# Patient Record
Sex: FEMALE | Race: WHITE
[De-identification: names, ages, dates, MRNs, and addresses within clinical notes are randomized per-mention and may not be internally consistent; named-entity substitution may affect disease eponyms.]

---

## 2018-02-16 ENCOUNTER — HOSPITAL ENCOUNTER (OUTPATIENT)
Dept: HOSPITAL 92 - EEG | Age: 73
Discharge: HOME | End: 2018-02-16
Payer: MEDICARE

## 2018-02-16 DIAGNOSIS — G40.409: Primary | ICD-10-CM

## 2018-02-16 PROCEDURE — 95816 EEG AWAKE AND DROWSY: CPT

## 2018-02-21 ENCOUNTER — HOSPITAL ENCOUNTER (INPATIENT)
Dept: HOSPITAL 92 - ERS | Age: 73
LOS: 8 days | Discharge: HOSPICE | DRG: 23 | End: 2018-03-01
Attending: FAMILY MEDICINE | Admitting: FAMILY MEDICINE
Payer: MEDICARE

## 2018-02-21 VITALS — BODY MASS INDEX: 29.5 KG/M2

## 2018-02-21 DIAGNOSIS — E11.22: ICD-10-CM

## 2018-02-21 DIAGNOSIS — I10: ICD-10-CM

## 2018-02-21 DIAGNOSIS — N18.2: ICD-10-CM

## 2018-02-21 DIAGNOSIS — E87.1: ICD-10-CM

## 2018-02-21 DIAGNOSIS — R77.1: ICD-10-CM

## 2018-02-21 DIAGNOSIS — G81.91: ICD-10-CM

## 2018-02-21 DIAGNOSIS — N39.0: ICD-10-CM

## 2018-02-21 DIAGNOSIS — Z66: ICD-10-CM

## 2018-02-21 DIAGNOSIS — I61.5: Primary | ICD-10-CM

## 2018-02-21 DIAGNOSIS — J96.00: ICD-10-CM

## 2018-02-21 DIAGNOSIS — G40.909: ICD-10-CM

## 2018-02-21 DIAGNOSIS — I12.9: ICD-10-CM

## 2018-02-21 DIAGNOSIS — E87.6: ICD-10-CM

## 2018-02-21 DIAGNOSIS — R40.2432: ICD-10-CM

## 2018-02-21 DIAGNOSIS — N17.9: ICD-10-CM

## 2018-02-21 DIAGNOSIS — F03.90: ICD-10-CM

## 2018-02-21 DIAGNOSIS — G91.1: ICD-10-CM

## 2018-02-21 LAB
ALBUMIN SERPL BCG-MCNC: 4.2 G/DL (ref 3.4–4.8)
ALP SERPL-CCNC: 75 U/L (ref 40–150)
ALT SERPL W P-5'-P-CCNC: 20 U/L (ref 8–55)
ANALYZER IN CARDIO: (no result)
ANION GAP SERPL CALC-SCNC: 14 MMOL/L (ref 10–20)
APTT PPP: 34.4 SEC (ref 22.9–36.1)
AST SERPL-CCNC: 23 U/L (ref 5–34)
BASE EXCESS STD BLDA CALC-SCNC: -1.6 MEQ/L
BILIRUB SERPL-MCNC: 0.3 MG/DL (ref 0.2–1.2)
BUN SERPL-MCNC: 19 MG/DL (ref 9.8–20.1)
CA-I BLDA-SCNC: 1.1 MMOL/L (ref 1.12–1.3)
CALCIUM SERPL-MCNC: 9.3 MG/DL (ref 7.8–10.44)
CHLORIDE SERPL-SCNC: 96 MMOL/L (ref 98–107)
CK MB SERPL-MCNC: 3.2 NG/ML (ref 0–6.6)
CO2 SERPL-SCNC: 24 MMOL/L (ref 23–31)
CREAT CL PREDICTED SERPL C-G-VRATE: 0 ML/MIN (ref 70–130)
GLOBULIN SER CALC-MCNC: 3.8 G/DL (ref 2.4–3.5)
GLUCOSE SERPL-MCNC: 162 MG/DL (ref 83–110)
GLUCOSE UR STRIP-MCNC: 100 MG/DL
HCO3 BLDA-SCNC: 22.9 MEQ/L (ref 22–26)
HCT VFR BLDA CALC: 38.2 % (ref 36–47)
HGB BLD-MCNC: 10.4 G/DL (ref 12–16)
HGB BLDA-MCNC: 10.3 G/DL (ref 12–16)
INR PPP: 0.9
MCH RBC QN AUTO: 27.4 PG (ref 27–31)
MCV RBC AUTO: 86.9 FL (ref 81–99)
MDIFF COMPLETE?: YES
PCO2 BLDA: 38.6 MMHG (ref 35–45)
PH BLDA: 7.39 [PH] (ref 7.35–7.45)
PLATELET # BLD AUTO: 382 THOU/UL (ref 130–400)
PLATELET BLD QL SMEAR: (no result)
PO2 BLDA: 100.4 MMHG (ref 80–100)
POLYCHROMASIA BLD QL SMEAR: (no result) (100X)
POTASSIUM BLD-SCNC: 3 MMOL/L (ref 3.7–5.3)
POTASSIUM SERPL-SCNC: 3.3 MMOL/L (ref 3.5–5.1)
PROTHROMBIN TIME: 12.6 SEC (ref 12–14.7)
RBC # BLD AUTO: 3.78 MILL/UL (ref 4.2–5.4)
SODIUM SERPL-SCNC: 131 MMOL/L (ref 136–145)
SP GR UR STRIP: 1.01 (ref 1–1.04)
SPECIMEN DRAWN FROM PATIENT: (no result)
TROPONIN I SERPL DL<=0.01 NG/ML-MCNC: (no result) NG/ML (ref ?–0.03)
WBC # BLD AUTO: 6.7 THOU/UL (ref 4.8–10.8)

## 2018-02-21 PROCEDURE — 87186 SC STD MICRODIL/AGAR DIL: CPT

## 2018-02-21 PROCEDURE — 87086 URINE CULTURE/COLONY COUNT: CPT

## 2018-02-21 PROCEDURE — 93005 ELECTROCARDIOGRAM TRACING: CPT

## 2018-02-21 PROCEDURE — A4216 STERILE WATER/SALINE, 10 ML: HCPCS

## 2018-02-21 PROCEDURE — 96365 THER/PROPH/DIAG IV INF INIT: CPT

## 2018-02-21 PROCEDURE — 80053 COMPREHEN METABOLIC PANEL: CPT

## 2018-02-21 PROCEDURE — 87077 CULTURE AEROBIC IDENTIFY: CPT

## 2018-02-21 PROCEDURE — 70450 CT HEAD/BRAIN W/O DYE: CPT

## 2018-02-21 PROCEDURE — 36415 COLL VENOUS BLD VENIPUNCTURE: CPT

## 2018-02-21 PROCEDURE — 36416 COLLJ CAPILLARY BLOOD SPEC: CPT

## 2018-02-21 PROCEDURE — 82805 BLOOD GASES W/O2 SATURATION: CPT

## 2018-02-21 PROCEDURE — 71045 X-RAY EXAM CHEST 1 VIEW: CPT

## 2018-02-21 PROCEDURE — 87040 BLOOD CULTURE FOR BACTERIA: CPT

## 2018-02-21 PROCEDURE — 80048 BASIC METABOLIC PNL TOTAL CA: CPT

## 2018-02-21 PROCEDURE — 85027 COMPLETE CBC AUTOMATED: CPT

## 2018-02-21 PROCEDURE — 94002 VENT MGMT INPAT INIT DAY: CPT

## 2018-02-21 PROCEDURE — 81003 URINALYSIS AUTO W/O SCOPE: CPT

## 2018-02-21 PROCEDURE — 85025 COMPLETE CBC W/AUTO DIFF WBC: CPT

## 2018-02-21 PROCEDURE — 82553 CREATINE MB FRACTION: CPT

## 2018-02-21 PROCEDURE — 84484 ASSAY OF TROPONIN QUANT: CPT

## 2018-02-21 PROCEDURE — 51702 INSERT TEMP BLADDER CATH: CPT

## 2018-02-21 PROCEDURE — 85610 PROTHROMBIN TIME: CPT

## 2018-02-21 PROCEDURE — 96375 TX/PRO/DX INJ NEW DRUG ADDON: CPT

## 2018-02-21 PROCEDURE — 85730 THROMBOPLASTIN TIME PARTIAL: CPT

## 2018-02-21 PROCEDURE — 5A1955Z RESPIRATORY VENTILATION, GREATER THAN 96 CONSECUTIVE HOURS: ICD-10-PCS | Performed by: EMERGENCY MEDICINE

## 2018-02-21 PROCEDURE — 93970 EXTREMITY STUDY: CPT

## 2018-02-21 PROCEDURE — 94003 VENT MGMT INPAT SUBQ DAY: CPT

## 2018-02-21 PROCEDURE — 96366 THER/PROPH/DIAG IV INF ADDON: CPT

## 2018-02-21 PROCEDURE — S0028 INJECTION, FAMOTIDINE, 20 MG: HCPCS

## 2018-02-21 PROCEDURE — 85007 BL SMEAR W/DIFF WBC COUNT: CPT

## 2018-02-21 RX ADMIN — NICARDIPINE HYDROCHLORIDE PRN MLS: 25 INJECTION INTRAVENOUS at 22:05

## 2018-02-21 NOTE — CT
NONCONTRAST CT OF THE BRAIN

2/21/18

 

INDICATION:

Stroke alert. Found unresponsive with left sided facial droop.

 

FINDINGS:

The examination was compared to a prior dated 11/6/17. There is a 4 cm intraparenchymal hematoma seen
 within the region of the left thalamus with surrounding vasogenic edema. There is intervertebral ext
ension of the third ventricles, lateral ventricles, and fourth ventricles. There is also extension of
 hemorrhage into the left midbrain.  There is left to right midline shift of 4 mm. There is some howard
a present also within the left aspect of the mid brain and tong causing some mild effacement of the a
nterior basilar cistern. The mastoid air cells are clear. The skull is intact. 

 

IMPRESSION:  

1.      Intraparenchymal hematoma seen within the region of the left thalamus with interventricular e
xtension. 

2.      There is left to right midline shift of approximately 5 mm. There is some vasogenic edema see
n surrounding the hematoma that has components that does extend into the very superior aspect of the 
left aspect of the mid brain. Vasogenic edema causes some mild effacement of the anterior basilar cis
tern.

 

Findings called to Dr. Sorenson at 4:58 p.m. on 2/21/18.

 

Code CR. 

 

POS: GAYATHRI

## 2018-02-22 LAB
ANALYZER IN CARDIO: (no result)
ANION GAP SERPL CALC-SCNC: 13 MMOL/L (ref 10–20)
BASE EXCESS STD BLDA CALC-SCNC: -2.5 MEQ/L
BASOPHILS # BLD AUTO: 0.1 THOU/UL (ref 0–0.2)
BASOPHILS NFR BLD AUTO: 0.5 % (ref 0–1)
BUN SERPL-MCNC: 18 MG/DL (ref 9.8–20.1)
CA-I BLDA-SCNC: 1.2 MMOL/L (ref 1.12–1.3)
CALCIUM SERPL-MCNC: 8.8 MG/DL (ref 7.8–10.44)
CHLORIDE SERPL-SCNC: 100 MMOL/L (ref 98–107)
CO2 SERPL-SCNC: 22 MMOL/L (ref 23–31)
CREAT CL PREDICTED SERPL C-G-VRATE: 37 ML/MIN (ref 70–130)
EOSINOPHIL # BLD AUTO: 0 THOU/UL (ref 0–0.7)
EOSINOPHIL NFR BLD AUTO: 0.1 % (ref 0–10)
GLUCOSE SERPL-MCNC: 230 MG/DL (ref 83–110)
HCO3 BLDA-SCNC: 21.2 MEQ/L (ref 22–26)
HCT VFR BLDA CALC: 32.3 % (ref 36–47)
HGB BLD-MCNC: 9.8 G/DL (ref 12–16)
HGB BLDA-MCNC: 9.1 G/DL (ref 12–16)
LYMPHOCYTES # BLD: 0.5 THOU/UL (ref 1.2–3.4)
LYMPHOCYTES NFR BLD AUTO: 4.3 % (ref 21–51)
MCH RBC QN AUTO: 28.3 PG (ref 27–31)
MCV RBC AUTO: 86.5 FL (ref 81–99)
MONOCYTES # BLD AUTO: 1.2 THOU/UL (ref 0.11–0.59)
MONOCYTES NFR BLD AUTO: 10.2 % (ref 0–10)
NEUTROPHILS # BLD AUTO: 10.1 THOU/UL (ref 1.4–6.5)
NEUTROPHILS NFR BLD AUTO: 84.9 % (ref 42–75)
O2 A-A PPRESDIFF RESPIRATORY: 54.1 MM[HG] (ref 0–20)
PCO2 BLDA: 32 MMHG (ref 35–45)
PH BLDA: 7.44 [PH] (ref 7.35–7.45)
PLATELET # BLD AUTO: 371 THOU/UL (ref 130–400)
PO2 BLDA: 119.8 MMHG (ref 80–100)
POTASSIUM BLD-SCNC: 3.7 MMOL/L (ref 3.7–5.3)
POTASSIUM SERPL-SCNC: 3.9 MMOL/L (ref 3.5–5.1)
RBC # BLD AUTO: 3.48 MILL/UL (ref 4.2–5.4)
SODIUM SERPL-SCNC: 131 MMOL/L (ref 136–145)
SPECIMEN DRAWN FROM PATIENT: (no result)
WBC # BLD AUTO: 11.9 THOU/UL (ref 4.8–10.8)

## 2018-02-22 PROCEDURE — 009600Z DRAINAGE OF CEREBRAL VENTRICLE WITH DRAINAGE DEVICE, OPEN APPROACH: ICD-10-PCS | Performed by: NEUROLOGICAL SURGERY

## 2018-02-22 RX ADMIN — CEFAZOLIN SCH MLS: 1 INJECTION, POWDER, FOR SOLUTION INTRAMUSCULAR; INTRAVENOUS at 05:15

## 2018-02-22 RX ADMIN — FAMOTIDINE SCH MG: 10 INJECTION, SOLUTION INTRAVENOUS at 20:19

## 2018-02-22 RX ADMIN — NICARDIPINE HYDROCHLORIDE PRN MLS: 25 INJECTION INTRAVENOUS at 20:19

## 2018-02-22 RX ADMIN — NICARDIPINE HYDROCHLORIDE PRN MLS: 25 INJECTION INTRAVENOUS at 02:01

## 2018-02-22 RX ADMIN — INSULIN HUMAN PRN UNIT: 100 INJECTION, SOLUTION PARENTERAL at 18:30

## 2018-02-22 RX ADMIN — CEFAZOLIN SCH MLS: 1 INJECTION, POWDER, FOR SOLUTION INTRAMUSCULAR; INTRAVENOUS at 14:44

## 2018-02-22 RX ADMIN — CEFAZOLIN SCH MLS: 1 INJECTION, POWDER, FOR SOLUTION INTRAMUSCULAR; INTRAVENOUS at 22:07

## 2018-02-22 NOTE — HP
Lam Castillo PA-C, dictating for Drew Cheung M.D.

 

This is a 50-minute initial patient consult in which greater than 50% of the exam was spent in counse
ling and coordinating the patient's care.  The remainder of the exam was spent in review of the sada
nt's medical records and appropriate imaging studies.

 

CHIEF COMPLAINT:  Altered mental status, patient found unresponsive with left thalamic hemorrhage.

 

HISTORY OF PRESENT ILLNESS:  Ms. Calloway presents to the [facility] via EMS as she was 
found down at her skilled nursing facility for an unknown length of time.  Apparently, the patient wa
s speaking with her son roughly at 3:00 and roughly around 4:00 hit her lifeline alert and became unr
esponsive.  Due to this lifeline alert, EMS was contacted and the patient's daughter came to her the 
facility where she resides.  At that time, the patient was unable to protect her airway and she was i
ntubated, received paralytics at that time.  She has received no other paralytics in the emergency ro
om at the time of the exam at roughly 6 p.m.  The patient's daughter provides the history as well as 
review of patient's medical records.  Apparently, the patient has a seizure disorder and has been on 
Keppra and has a history of hyponatremia.  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  The silvana rendon was previously on aspirin and had a history of hypertension and systolic blood pressure was in the 
200s when she presented earlier tonight.  The patient does not use tobacco.  She does have a history 
of some dizziness and TIAs with slight amount of gait difficulty over the past several months for whi
ch she has required use of a cane.  The patient has not had many falls; however, that the patient's elsie hernandez knows about.  Review of the patient's CT scan shows left thalamic hemorrhage entering into th
e left ventricular system and component of hydrocephalus.

 

PHYSICAL EXAMINATION:  The patient is currently intubated.  Her GCS is V1 E1 M4, which makes her a 6T
.  She does have corneal reflexes bilaterally.  Pupils are sluggishly reactive bilaterally and the ri
ght pupil is roughly 3 mm on the right and 5 mm on the left.  She is currently overbreathing the vent
ilator and has a cough reflex.  She also has positive Doll's eyes.  She attempts to withdrawal in the
 bilateral lower extremities and has very minimal withdrawal to painful stimulus on the right with mo
re withdrawal to painful stimulus on the left upper extremities.

 

IMPRESSION AND DIAGNOSES:

1.  Status post hypertensive bleed into the left thalamic region extending into the left ventricular 
systems.

2.  History of hypertension.

 

PLAN:  I have discussed at great length the patient's poor prognosis as has the emergency room.  I di
d provide options for the family including no intervention versus placement emergently of an EVD.  Th
e patient's daughter states that she would like to have a fighting chance of life and therefore they 
have asked us to proceed with placement of the EVD.  We will proceed with placement of this in the 
U.  We will elevate her head of bed 30 degrees at all times, repeat head CT for tomorrow.  Risks and 
benefits of an EVD placement were discussed in great length with the patient's daughter as well as wi
th her son on the telephone.  Again, they both agreed that proceeding with EVD as the next step the m
other would like us to take and we will therefore proceed with this.  Ample opportunity was given to 
the patient's family to discuss her questions and concerns they understand that again the patient's p
rognosis remains poor even with placement of EVD.

## 2018-02-22 NOTE — OP
DATE OF PROCEDURE:  02/22/2018

 

SURGEON:  Drew Cheung M.D.

 

ASSISTANT:  Lam Castillo PA-C

 

OPERATIVE PROCEDURE:  Placement of right frontal external ventricular drain via twist drill.

 

PREPROCEDURE DIAGNOSIS:  Obstructive hydrocephalus.

 

POSTPROCEDURE DIAGNOSES:  Obstructive hydrocephalus.

 

DESCRIPTION OF PROCEDURE:  After informed consent was obtained from the patient, informed consent was
 obtained.  Right frontal Kocher's region was identified and prepped.  Proper patient pause and ident
ification was carried out.  A small incision was made.  The drill brought into the field, the skull p
erforated.  The dura opened, external ventricular catheter was placed without difficulty into the laury
tricle with release of bloody CSF.  This was connected to the Richardson drain system and opened at 10 cm
 of water.  We released approximately 20-25 mL of CSF resulting in neurological improvement in the pa
tient.

## 2018-02-22 NOTE — CON
DATE OF CONSULTATION:  02/22/2018

 

HISTORY OF PRESENT ILLNESS:  Ms. Calloway is a 72-year-old female, who has been dealing with dementia p
er my discussion with the son.

 

She presented with parenchymal brain hemorrhage.  Ventricular drain has been placed.

 

He has also been seen by Dr. Moreira, her primary care physician.  According to , she was gettin
g to a point where she was starting to have some good days and some really bad days from the [facility]
nt of orientation.

 

PAST MEDICAL HISTORY:

1.  Remarkable for seizure disorder.

2.  Dementia.

3.  History of transient ischemic attacks.

4.  History of neck surgery in the past.

5.  History of cholecystectomy.

6.  History of vein stripping.

7.  History of cataract surgeries.

8.  History of tubal ligation.

9.  History of hyponatremia.

10.  History type 2 diabetes.

11.  History of hypertension.

 

SOCIAL HISTORY:  She is a nonsmoker, nondrinker.  She has no drug use history.

 

ALLERGIES:  She reports allergies to SULFA.

 

MEDICATIONS:  Prior to admission, she was on Keppra, metformin, metoprolol, and Lialda.

 

SOCIAL HISTORY:  She is , very supportive son and the daughter-in-law, there is also a monserrat
r, her son lives in [place], works for [employer] as a .  He is a very b
alanced grasp of his mom's care issues in my opinion.

 

PHYSICAL EXAMINATION:

VITAL SIGNS:  Heart rate 103, respiratory rate is 14, blood pressure 129/68.  Oximetry is 100%.

GENERAL:  She has a bandage on top of her right scalp.

HEENT:  Pupils are slightly asymmetrical and the left pupil slightly larger than the right.  They do 
not react briskly.  She does blink, she does have a gag.

NECK:  Supple.  She is orally intubated.

LUNGS:  Clear.

HEART:  Regular rhythm.  S1 and S2 are normal.

ABDOMEN:  Soft and nontender.  No mass or organomegaly.

EXTREMITIES:  Without clubbing, cyanosis, or edema.  Her right toe is upgoing, left toe is mid positi
on.

 

IMPRESSION:

1.  Parenchymal brain hemorrhage status post placement of ventriculostomy.

2.  Dementia, likely vascular in progressive.  I discussed do not resuscitate status with the son.  I
 also discussed setting some boundaries of care about how aggressive we are going to be since she is 
dealing with declining mental health.

 

He is very receptive to this discussion and discussed with his sister and his wife.

 

She at least at this point in time appears to be stable.

 

LABORATORY AND X-RAY FINDINGS:  Lab work has been reviewed.  White count 11.9, hemoglobin 9.8, platel
ets 371.  Sodium 131, potassium 3.9, chloride 100, bicarbonate 22, BUN 18, creatinine 1.48, pH 7.44, 
CO2 of 32, pO2 of 119.

 

OTHER ISSUES:  Include

1.  Diabetes.

2.  Hypertension is well controlled.

3.  Acute on chronic kidney dysfunction.

4.  Anemia with a normal mean corpuscular volume ? of chronic disease.

5.  Chronic mild hyponatremia.

6.  Hyperglobulinemia.  Certainly we need to workup for myeloma at this point.

 

I will be happy to follow with ER physicians, I have explained to the family that she is not weanable
 until neurological status improves.  I am not sure I would proceed down the path for tracheostomy or
 even a PEG unless family decides that they want to go this route.  My initial impression is they zeenat
l not.  Serial exams will help us guide them better.  I would not anticipate that she would improve q
tyson.

 

Critical care time 35 minutes.

## 2018-02-22 NOTE — PRG
DATE OF SERVICE:  02/22/2018

 

Ms. Calloway is hospital day 1 following placement of right frontal external ventricular drain.  Her in
tracranial pressures have been in the 5-6 mmHg range with excellent waveform.  Neurologically, she is
 improved.  She prefers to keep her eyes closed.  Her left pupil is 4 mm and nonreactive, right pupil
 is 3 mm and nonreactive.  She has brisk corneal responses and a positive gag.  She localizes in both
 upper extremities and left lower extremity and withdraws in the right lower extremity.  We will cont
inue to have her drain open at 10 cm of water.

 

DIAGNOSIS:  Hydrocephalus.

## 2018-02-22 NOTE — CON
DATE OF CONSULTATION:  02/22/2018

 

ADMITTING PHYSICIAN:  Dr. Cheung.

 

CONSULTING PHYSICIAN:  Dr. Waldemar Moreira.

 

HISTORY OF PRESENT ILLNESS:  The patient is a 72-year-old female well known to me.  She has a known h
istory of type 2 diabetes mellitus, hypertension, history of multiple TIAs of unknown etiology and de
mentia.  She was in her normal state of health and apparently she was found down in her apartment are
a.  She was brought to the emergency room via 911.  She was intubated at that time, found to have an 
intracerebral hemorrhage of thalamic bleed.  She has been intubated.  She has undergone operative pro
cedure for placement of a ventricular drain for obstructive hydrocephalus due to her thalamic bleed.

 

As noted, this patient is well known to me.  She has had multiple admissions over the last several ye
ars related to altered mental status of unknown etiology as well as multiple TIAs, all of which have 
revealed no evidence of any etiology.  She has chronic hyponatremia upon admissions that seems to imp
rove with treatment.  She runs chronic hyponatremia in my office as well.  She has a known history of
 type 2 diabetes mellitus and hypertension.  She has had no prior history of coronary artery disease.
  No prior history of cerebrovascular disease.

 

ALLERGIES:  She is allergic to SULFA.

 

CURRENT MEDICATIONS:  Include Keppra, metformin, metoprolol and Lialda.

 

PAST SURGICAL HISTORY:  Positive for multiple neck surgery, cholecystectomy, vein stripping, bilatera
l cataract surgery and tubal ligation.

 

SOCIAL AND PERSONAL HISTORY:  She has recently moved to [facility].  She does report that has been so
me multiple episodes of confusion at times; however, family reports over the last several months she 
has been doing quite well.  Social and personal is also significant for passing of her  severa
l years ago, which has exacerbated some of her mental confusion.

 

PHYSICAL EXAMINATION:

GENERAL:  She is currently intubated.  She does not respond to verbal stimuli.  She does respond to w
ithdrawal of pain.

VITAL SIGNS:  Temperature is 100 degrees, respirations 14, /84, pulse 110 and O2 sat is 100%.

LUNGS:  Clear.

HEART:  Reveals a regular rate and rhythm with a soft systolic murmur.

ABDOMEN:  Soft and nontender.  The bowel sounds are active.  There is no hepatosplenomegaly noted.  T
here is no rebound or guarding.

EXTREMITIES:  No clubbing, edema or cyanosis.

 

LABORATORY DATA:  Her white blood count is 11.9, hemoglobin 9.8 and hematocrit 30.1.  Chemistry:  Sod
ium 131, potassium 3.9, chloride 100, CO2 of 22, BUN 18 and creatinine 1.48.

 

IMPRESSION:  This is a 72-year-old female with;

1.  Intracerebral hemorrhage, thalamic bleed.

2.  Prior history of type 2 diabetes mellitus.

3.  Hypertension.  The patient was hypertensive upon admission.

4.  Dementia.

 

PLAN:  I have been consulted for medical care.  She is currently intubated.  Dr. [facility] hospice was 
consulted as well.  She has been placed in hyperglycemia protocol with sliding scale insulin as well 
as Accu-Cheks.  I have discussed the findings with the family.  I will keep the other specialties upd
ated as to her baseline status.

## 2018-02-22 NOTE — PRG
DATE OF SERVICE:  02/22/2018

 

This is a 30 minute initial hospital visit note in which 30 minutes were spent in review the imaging,
 record, evaluation and examination of the patient, and formulation of a plan.  Greater than 50% of t
he time was spent in counseling on Trisha Calloway.  

 

CHIEF COMPLAINT:  Left thalamic hypertensive hemorrhage with intraventricular extension and obstructi
ve hydrocephalus.  I reviewed the notes of my colleague Lam Castillo PA-C, and agree with its cont
ent.  Ms. Calloway is a very pleasant 72-year-old woman with a history of hypertension.  She had systol
ics over 220 on presentation this evening.  She sustained a left thalamic hemorrhage with intraventri
cular extension and obstructive hydrocephalus.  She was noted to be a GCS 3T.  I reviewed her images 
and our team discussed with the family placement of an external ventricular drain.  Goals, indication
s, risks, alternatives and complications were discussed in detail with the patient's family.  They un
derstand the risks are up to including, but not limited to wound healing issues such as infection, bl
eeding, CSF leak.  The risk of progression of hemorrhage and hydrocephalus.  They understand these ri
sks and they also understand there is potential for need for shunt in the future.  Basically, the quang
in is being placed to treat hydrocephalus.  We will work to control her blood pressures.

 

DIAGNOSES:  Hypertensive hemorrhage in the left thalamus with intraventricular extension and obstruct
leatha hydrocephalus.

## 2018-02-23 LAB
ANALYZER IN CARDIO: (no result)
BASE EXCESS STD BLDA CALC-SCNC: -3.4 MEQ/L
HCO3 BLDA-SCNC: 20.6 MEQ/L (ref 22–26)
HCT VFR BLDA CALC: 26.9 % (ref 36–47)
HGB BLDA-MCNC: 8.1 G/DL (ref 12–16)
PCO2 BLDA: 32.6 MMHG (ref 35–45)
PH BLDA: 7.42 [PH] (ref 7.35–7.45)
PO2 BLDA: 91.4 MMHG (ref 80–100)
SPECIMEN DRAWN FROM PATIENT: (no result)

## 2018-02-23 RX ADMIN — FAMOTIDINE SCH MG: 10 INJECTION, SOLUTION INTRAVENOUS at 20:08

## 2018-02-23 RX ADMIN — INSULIN HUMAN PRN UNIT: 100 INJECTION, SOLUTION PARENTERAL at 12:43

## 2018-02-23 RX ADMIN — CEFAZOLIN SCH MLS: 1 INJECTION, POWDER, FOR SOLUTION INTRAMUSCULAR; INTRAVENOUS at 14:49

## 2018-02-23 RX ADMIN — NICARDIPINE HYDROCHLORIDE PRN MLS: 25 INJECTION INTRAVENOUS at 20:06

## 2018-02-23 RX ADMIN — NICARDIPINE HYDROCHLORIDE PRN MLS: 25 INJECTION INTRAVENOUS at 10:25

## 2018-02-23 RX ADMIN — INSULIN HUMAN PRN UNIT: 100 INJECTION, SOLUTION PARENTERAL at 21:52

## 2018-02-23 RX ADMIN — INSULIN HUMAN PRN UNIT: 100 INJECTION, SOLUTION PARENTERAL at 05:55

## 2018-02-23 RX ADMIN — CEFAZOLIN SCH MLS: 1 INJECTION, POWDER, FOR SOLUTION INTRAMUSCULAR; INTRAVENOUS at 05:46

## 2018-02-23 RX ADMIN — CEFAZOLIN SCH MLS: 1 INJECTION, POWDER, FOR SOLUTION INTRAMUSCULAR; INTRAVENOUS at 21:39

## 2018-02-23 NOTE — PRG
DATE OF SERVICE:  02/23/2018

 

SUBJECTIVE:  Ms. aClloway at least opens her eyes today.  She is still not following commands.  She berman
s have spontaneous movement of her left leg and left hand.

 

OBJECTIVE:

VITAL SIGNS:  /64, pulse 84, O2 sats 100% and she is still intubated.  Blood sugars are under b
triny control.

LUNGS:  Clear.

HEART:  Reveals no murmur.

ABDOMEN:  Soft.

NEUROLOGIC:  As noted, she does spontaneously move her left leg.  She does not seem to follow verbal 
commands.

 

IMPRESSION:

1.  Intracerebral bleed, thalamic bleed.

2.  Hypertension, controlled.

3.  Type 2 diabetes mellitus, controlled.

 

PLAN:  Continue observation and medical management per Neurosurgery and ventilator management per Pul
monary.

## 2018-02-23 NOTE — EEG
*******************************************************************************
********************************************************************************
*****

Referring Physician: DR. GABRIELA ARNOLD   

 *******************************************************************************
********************************************************************************
*****

EEG # [id number]

TEST TYPE: ROUTINE OUTPATIENT

PROCEDURE:  Outpatient electroencephalogram  

NAME OF PATIENT:  Trisha Calloway

ACCOUNT #[id number] 

DATE EEG DONE: 2018

INDICATION:  History of seizures and stroke, slurred speech, leg weakness, 
memory deficit 





REPORT:  



This is a 22-channel digital EEG recording utilizing 10-20 international 
electrode placement system on a patient with history of seizures, stroke, who 
presents with slurred speech,  leg weakness, memory deficit with no seizure or 
loss of consciousness.



During wakefulness, the background activity consists of low to moderate 
amplitude theta rhythm of 6-7 Hz.  It is symmetric and reactive.  There is 
occasionally 8 Hz seen.  There is low amplitude fast beta activity noted and 
also, myogenic activity noted representing frontalis and temporalis muscles 
bilaterally.



DROWSINESS AND SLEEP:  Subject is able to attain some drowsiness with diffuse 
theta activity.  There is no clear sleep recorded during this EEG.  



ABNORMALITIES:  Mild diffuse slowing noted.  There may be spike and slow wave 
activity noted in the right hemisphere, posterior region at 10:59:41.  There is 
no other clear convincing epileptiform activity noted throughout the recording.
  No electrographic seizures noted.



INDUCTION:  



HYPERVENTILATION:  With fair effort results in diffuse theta activity.

PHOTIC STIMULATION:  No photic drive seen.



EK per minute.





IMPRESSION: 



DURING THIS EEG, MILD DIFFUSE SLOWING NOTED. THERE MAY BE SPIKE AND SLOW WAVE 
ACTIVITY NOTED IN THE RIGHT HEMISPHERE, POSTERIOR REGION AT 10:59:41, SEEN ONLY 
ONCE.  THERE IS NO OTHER CLEAR AND CONVINCING EPILEPTIFORM ACTIVITY NOTED 
THROUGHOUT THE RECORDING.  CLINICAL CORRELATION RECOMMENDED.





Technician: LYUBOV

: [email or web address] KU

## 2018-02-23 NOTE — RAD
SINGLE VIEW CHEST:

 

Date:  02/23/18 

 

COMPARISON:  

12/23/17. 

 

HISTORY:  

Ventilated patient with respiratory failure. 

 

FINDINGS:

Single view of the chest shows a cardiomediastinal silhouette which is upper limits of normal in size
. An endotracheal tube is seen with its tip between the clavicles. A NG tube courses off the inferior
 aspect of the film. Linear opacity in the left lung base may represent atelectasis. No pleural effus
ion is seen. 

 

IMPRESSION: 

Left lower lobe atelectasis. 

 

 

POS: OFF

## 2018-02-23 NOTE — PRG
DATE OF SERVICE:  02/23/2018

 

SUBJECTIVE:  Brodie appears to be moving her left side more today.

 

OBJECTIVE:

VITAL SIGNS:  She is 130/72, heart rate 111, respiratory rate 15, oximetry is 100%.

HEENT:  Pupils react.  Sclerae is anicteric.

NECK:  Supple.

LUNGS:  Clear.

HEART:  Regular rhythm.  S1 and S2 are normal.

ABDOMEN:  Soft.

EXTREMITIES:  Without asymmetry.

NEUROLOGIC:  She is moving her left side.  She is flaccid on her right.

 

LABORATORY DATA:  There is no lab today.

 

IMPRESSION:

1.  Respiratory failure associated with cerebrovascular accident.

2.  Chronic kidney disease.  She needs lab on a daily basis for now.

3.  Borderline hyponatremia.

4.  Diabetes.

5.  Pre-existing dementia.

 

PLAN:  Continue supportive care.  He is not a candidate for weaning or extubation at this time.  She 
needs to start on some tube feeds.  The family never made a decision regarding DO-NOT-RESUSCITATE sta
tus from what I can tell.  I had a long meeting with them yesterday.

 

Critical care time was 30 minutes.

## 2018-02-24 LAB
ANALYZER IN CARDIO: (no result)
ANION GAP SERPL CALC-SCNC: 10 MMOL/L (ref 10–20)
BASE EXCESS STD BLDA CALC-SCNC: -3.8 MEQ/L
BUN SERPL-MCNC: 17 MG/DL (ref 9.8–20.1)
CA-I BLDA-SCNC: 1.2 MMOL/L (ref 1.12–1.3)
CALCIUM SERPL-MCNC: 8 MG/DL (ref 7.8–10.44)
CHLORIDE SERPL-SCNC: 112 MMOL/L (ref 98–107)
CO2 SERPL-SCNC: 18 MMOL/L (ref 23–31)
CREAT CL PREDICTED SERPL C-G-VRATE: 45 ML/MIN (ref 70–130)
GLUCOSE SERPL-MCNC: 154 MG/DL (ref 83–110)
HCO3 BLDA-SCNC: 19.9 MEQ/L (ref 22–26)
HCT VFR BLDA CALC: 27 % (ref 36–47)
HGB BLD-MCNC: 9 G/DL (ref 12–16)
HGB BLDA-MCNC: 8.1 G/DL (ref 12–16)
MCH RBC QN AUTO: 24.4 PG (ref 27–31)
MCV RBC AUTO: 87.3 FL (ref 81–99)
MDIFF COMPLETE?: YES
O2 A-A PPRESDIFF RESPIRATORY: 88.03 MM[HG] (ref 0–20)
PCO2 BLDA: 30.3 MMHG (ref 35–45)
PH BLDA: 7.43 [PH] (ref 7.35–7.45)
PLATELET # BLD AUTO: 260 THOU/UL (ref 130–400)
PLATELET BLD QL SMEAR: (no result)
PO2 BLDA: 88 MMHG (ref 80–100)
POTASSIUM BLD-SCNC: 2.9 MMOL/L (ref 3.7–5.3)
POTASSIUM SERPL-SCNC: 3.3 MMOL/L (ref 3.5–5.1)
RBC # BLD AUTO: 3.68 MILL/UL (ref 4.2–5.4)
SODIUM SERPL-SCNC: 137 MMOL/L (ref 136–145)
SPECIMEN DRAWN FROM PATIENT: (no result)
WBC # BLD AUTO: 7.7 THOU/UL (ref 4.8–10.8)

## 2018-02-24 RX ADMIN — INSULIN HUMAN PRN UNIT: 100 INJECTION, SOLUTION PARENTERAL at 04:28

## 2018-02-24 RX ADMIN — NICARDIPINE HYDROCHLORIDE PRN MLS: 25 INJECTION INTRAVENOUS at 06:29

## 2018-02-24 RX ADMIN — INSULIN HUMAN PRN UNIT: 100 INJECTION, SOLUTION PARENTERAL at 21:26

## 2018-02-24 RX ADMIN — NICARDIPINE HYDROCHLORIDE PRN MLS: 25 INJECTION INTRAVENOUS at 22:35

## 2018-02-24 RX ADMIN — FAMOTIDINE SCH MG: 10 INJECTION, SOLUTION INTRAVENOUS at 21:21

## 2018-02-24 RX ADMIN — CEFAZOLIN SCH MLS: 1 INJECTION, POWDER, FOR SOLUTION INTRAMUSCULAR; INTRAVENOUS at 06:25

## 2018-02-24 RX ADMIN — CEFAZOLIN SCH MLS: 1 INJECTION, POWDER, FOR SOLUTION INTRAMUSCULAR; INTRAVENOUS at 14:23

## 2018-02-24 RX ADMIN — CEFAZOLIN SCH MLS: 1 INJECTION, POWDER, FOR SOLUTION INTRAMUSCULAR; INTRAVENOUS at 21:21

## 2018-02-24 NOTE — PRG
DATE OF SERVICE:  02/24/2018

 

This is Neurosurgery ICU progress note.

 

SUBJECTIVE:  I saw Ms. Calloway in her ICU room this morning.  Her vital signs look stable to me.  She 
is on a propofol drip as I examined her.  Nursing does not report any events overnight.

 

Even on the propofol drip with some stimulation she opens her eyes.  She localizes with left upper ex
tremity, the right side is not moving.

 

The EVD is working appropriately.

 

This weekend, we will keep the EVD open at 10 cm of water.  I expect more bloody CSF to come out over
 time as the clot slowly dissolves.  We will get an ultrasound of the lower extremities to monitor fo
r DVT.  She will need occupational, speech and physical therapy and most likely skilled nursing place
ment.

## 2018-02-24 NOTE — ULT
BILATERAL LOWER EXTREMITY VENOUS DOPPLER ULTRASOUND:

 

Date:  02/24/18 

 

HISTORY:  

CVA, immobility, bilateral lower extremity edema. 

 

TECHNIQUE:  

Gray scale ultrasound with color flow and spectral Doppler imaging of the deep venous systems of the 
lower extremities was performed bilaterally. 

 

FINDINGS:

There is good flow, compression, and augmentation noted in the common femoral, femoral, deep femoral,
 popliteal, posterior tibial, and greater saphenous veins on either side. 

 

IMPRESSION: 

No evidence of deep venous thrombosis in either lower extremity.  

 

POS: IVAN

## 2018-02-24 NOTE — PRG
DATE OF SERVICE:  02/24/2018

 

HISTORY OF PRESENT ILLNESS:  The patient remains intubated and sedated, 
continues on Cardene drip for blood pressure control on propofol for sedation, 
has ventricular drain in place.  His glucerna tube feeds running without 
difficulties per nursing staff.  Green catheter in place.  The patient is 
nonverbal at time of exam, intubated and sedated.  No communication.  The 
patient did move left upper and bilateral lower extremities with light stimuli.

 

VITAL SIGNS:  Include temperature of 100.1, heart rate of 98, blood pressure 150
/65, oxygen saturation 100% on ventilator.

 

LABORATORY:  Include hemoglobin 9.0, platelet count of 260, bicarbonate of 19.9
, pO2 of 88%.  Blood gas glucose last 12 hours, 153 range to 189.  Potassium of 
3.3, creatinine of 1.24.  Repeat chest x-ray without acute changes, stable tube 
placements.  Venogram of lower extremities without DVTs bilaterally.

 

PHYSICAL EXAMINATION:

GENERAL:  The patient is intubated and sedated.  ET tube and OG tube in place.

HEART:  Regular rate and rhythm at time of exam.

LUNGS:  Coarse lung sounds bilaterally.

ABDOMEN:  Soft, positive bowel sounds throughout.  Green catheter in place.

EXTREMITIES:  No lower extremity edema.  Capillary refill intact bilateral 
lower extremities as above.  The patient moved left upper and bilateral lower 
extremities with light stimulus at the time of exam was not following commands, 
however.

 

ASSESSMENT AND PLAN:  Acute respiratory failure secondary to intracranial bleed
, hypokalemia, diabetes, CKD, hypertension.  At this time, patient's blood 
glucoses are stable.  Potassium is being replaced.  Patient has had ventricular 
drain placed by Neurosurgery.  No plans to remove during the weekend.  Will 
follow up on repeat CT later this week as per surgery recommendations.  
Pulmonology does not feel patient is stable enough for extubation.  Blood 
pressures remain controlled per protocol on IV drip.  We will continue to 
follow inpatient, covering for Dr. Waldemar Moreira.

 

[facility]

## 2018-02-24 NOTE — PRG
DATE OF SERVICE:  02/24/2018

 

A 35 minutes critical care time

 

SUBJECTIVE:  The patient remains intubated on mechanical ventilation.

 

OBJECTIVE:

VITAL SIGNS:  Temperature is 99.1, pulse 102, blood pressure 129/73.  A 24-hour intake is 3846, outpu
t 2290.

NEUROLOGICAL:  She is hemiparetic on the right side.  She withdraws to the left.  She has an upgoing 
toe on the right side.

HEENT:  Pupils are reactive.  Eyes are not deviated.  She has a bandage around her head.  She has an 
oral endotracheal tube in place.

NECK:  No JVD, no bruits, no thyromegaly.

LUNGS:  Clear to auscultation anteriorly.

CARDIOVASCULAR:  S1 and S2 regular, without murmur.

ABDOMEN:  Soft and nontender.  She is tolerating tube feeds.

EXTREMITIES:  No clubbing, cyanosis, or edema, no skin lesions.

 

LABORATORY DATA AND X-RAY FINDINGS:  White blood cell count 7.7, hematocrit 32.1, platelet count 260,
 pH 7.43, PCO2 30, PO2 88 on SIMV rate 14, tidal volume 450, PEEP 5, pressure support 10, FiO2 30%.  
Sodium 137, potassium 3.3, chloride 112, CO2 18, BUN 17, creatinine 1.2, glucose 154.  Chest x-ray sh
owed no acute mass, effusion or infiltrate.

 

ASSESSMENT:

1.  Acute respiratory failure secondary to the effects of intracranial bleed.

2.  Chronic kidney disease.

3.  Hypokalemia.

4.  Diabetes.

5.  Preexisting dementia.

 

PLAN:  So far, neurologic recovery seems far off.  I do not think she can maintain her airway if extu
bated at this time.  We will continue blood pressure control with nicardipine.  We will replace her p
otassium.  I will go ahead and culture her for the fever, I think the fever is probably central in or
igin.  She is remaining on tube feeds.

## 2018-02-24 NOTE — PRG
DATE OF SERVICE:  02/24/2018

 

SUBJECTIVE:  Ms. Calloway is a 72-year-old female, who has a left thalamic hypertensive bleed with intr
aventricular hemorrhage and HCP.  Her exam is somewhat improved after an EVD was placed.  She is able
 to localize with the left arm and she withdraws with the right arm to pain.  This morning, she is st
ill on a low-dose Cardene drip, as blood pressure parameters for systolic blood pressure set for 140.
  We will continue to leave the EVD open at 10 cm of water over the weekend, and we will try to wean 
her early next week.  It was noted that ICPs go much higher when the patient is stimulated, which is 
expected.

 

OBJECTIVE:  Overnight, her vital signs have been stable.

 

LABORATORY DATA:  This morning, her hemoglobin is 9, hematocrit is 32.1.  She has a blood glucose of 
153 this morning.

 

We will continue to watch her over the weekend with doing neurologic checks.  If there are further qu
estions, please feel free to contact Neurosurgery.

## 2018-02-25 LAB
ANALYZER IN CARDIO: (no result)
ANION GAP SERPL CALC-SCNC: 9 MMOL/L (ref 10–20)
BASE EXCESS STD BLDA CALC-SCNC: -5.7 MEQ/L
BUN SERPL-MCNC: 23 MG/DL (ref 9.8–20.1)
CA-I BLDA-SCNC: 1.2 MMOL/L (ref 1.12–1.3)
CALCIUM SERPL-MCNC: 8.1 MG/DL (ref 7.8–10.44)
CHLORIDE SERPL-SCNC: 110 MMOL/L (ref 98–107)
CO2 SERPL-SCNC: 22 MMOL/L (ref 23–31)
CREAT CL PREDICTED SERPL C-G-VRATE: 48 ML/MIN (ref 70–130)
GLUCOSE SERPL-MCNC: 142 MG/DL (ref 83–110)
HCO3 BLDA-SCNC: 18.1 MEQ/L (ref 22–26)
HCT VFR BLDA CALC: 24.2 % (ref 36–47)
HGB BLD-MCNC: 8.8 G/DL (ref 12–16)
HGB BLDA-MCNC: 7.1 G/DL (ref 12–16)
MCH RBC QN AUTO: 28.4 PG (ref 27–31)
MCV RBC AUTO: 89.2 FL (ref 81–99)
MDIFF COMPLETE?: YES
O2 A-A PPRESDIFF RESPIRATORY: 96.78 MM[HG] (ref 0–20)
PCO2 BLDA: 28.5 MMHG (ref 35–45)
PH BLDA: 7.42 [PH] (ref 7.35–7.45)
PLATELET # BLD AUTO: 306 THOU/UL (ref 130–400)
PLATELET BLD QL SMEAR: (no result)
PO2 BLDA: 81.5 MMHG (ref 80–100)
POTASSIUM BLD-SCNC: 3 MMOL/L (ref 3.7–5.3)
POTASSIUM SERPL-SCNC: 3.3 MMOL/L (ref 3.5–5.1)
RBC # BLD AUTO: 3.09 MILL/UL (ref 4.2–5.4)
SODIUM SERPL-SCNC: 138 MMOL/L (ref 136–145)
SPECIMEN DRAWN FROM PATIENT: (no result)
WBC # BLD AUTO: 9.1 THOU/UL (ref 4.8–10.8)

## 2018-02-25 RX ADMIN — NICARDIPINE HYDROCHLORIDE PRN MLS: 25 INJECTION INTRAVENOUS at 20:45

## 2018-02-25 RX ADMIN — FAMOTIDINE SCH MG: 10 INJECTION, SOLUTION INTRAVENOUS at 21:16

## 2018-02-25 RX ADMIN — CEFAZOLIN SCH MLS: 1 INJECTION, POWDER, FOR SOLUTION INTRAMUSCULAR; INTRAVENOUS at 21:17

## 2018-02-25 RX ADMIN — NICARDIPINE HYDROCHLORIDE PRN MLS: 25 INJECTION INTRAVENOUS at 04:51

## 2018-02-25 RX ADMIN — NICARDIPINE HYDROCHLORIDE PRN MLS: 25 INJECTION INTRAVENOUS at 11:33

## 2018-02-25 RX ADMIN — CEFAZOLIN SCH MLS: 1 INJECTION, POWDER, FOR SOLUTION INTRAMUSCULAR; INTRAVENOUS at 05:06

## 2018-02-25 RX ADMIN — INSULIN HUMAN PRN UNIT: 100 INJECTION, SOLUTION PARENTERAL at 11:34

## 2018-02-25 RX ADMIN — CEFAZOLIN SCH MLS: 1 INJECTION, POWDER, FOR SOLUTION INTRAMUSCULAR; INTRAVENOUS at 13:51

## 2018-02-25 RX ADMIN — INSULIN HUMAN PRN UNIT: 100 INJECTION, SOLUTION PARENTERAL at 20:56

## 2018-02-25 RX ADMIN — INSULIN HUMAN PRN UNIT: 100 INJECTION, SOLUTION PARENTERAL at 16:46

## 2018-02-25 RX ADMIN — SCOPALAMINE SCH MG: 1 PATCH, EXTENDED RELEASE TRANSDERMAL at 08:49

## 2018-02-25 NOTE — PRG
DATE OF SERVICE:  02/25/2018

 

Ms. Calloway is a 72-year-old female that I saw in her room this morning.  Her exam has not really campos
ged since yesterday.  Overnight, there have been no acute events.  Her vital signs have been stable. 
 ICPs have been ranging between 6 and 10 over the past 12 hours.  She has been putting out between 8 
and 10 mL of CSF per hour.  On physical exam, Ms. Calloway continues to withdraw to pain on the right u
pper extremity and right lower extremity; however, in the left upper extremity, she localizes the astrid
n and she moves the left lower extremity spontaneously.  This morning, she has her eyes open.  Her pu
pils are equal and reactive to light.  She does not follow my commands when doing a cranial nerve be
t; however, when I asked her to give me a thumbs up on the left, she is able to follow commands.  She
 is unable to follow that command on the right upper extremity.  Venogram was done yesterday that bakari
ws no evidence of deep vein thrombosis bilaterally in the lower extremities.  Chest x-ray also was do
ne yesterday that showed no significant interval change since previous day's exam.  We will continue 
to watch Ms. Calloway in the ICU with her EVD in place, open at 10 cm of water.  This morning, there is
 still bloody CSF that has come out, but as the clot slowly dissolves I would expect it to continue t
o be bloody.  Patient will likely need occupational, speech therapy, physical therapy, and skilled nu
rsing placement.  If there are any further questions, please feel free to contact Neurosurgery.

## 2018-02-25 NOTE — PRG
DATE OF SERVICE:  02/25/2018

 

HISTORY OF PRESENT ILLNESS:  The patient is being weaned off propofol sedation for attempts at self-b
reathing on ET tube.  She has been continuing to move her left upper and lower extremities with ease.
  Some minor movement of right lower extremity.  No movement of right upper extremity reported by [facility] staff.  She has been tolerating her tube feeds.  Remains on Cardene drip for blood pressure main
tenance.  No blood glucose problems reported.  Ventricular drain continues to put out blood-tinged co
ngestive heart failure fluid to satisfaction of Neurosurgery.  Met with her son at bedside and answer
ed all questions prior to leaving unit, who verbalized understanding.  Follow up of repeat CT later t
his week and possible extubation in the next 48 hours depending on the patient's progress.

 

PHYSICAL EXAMINATION:

VITAL SIGNS:  Temperature of 101.0, heart rate of 106, blood pressure 131/53, oxygen saturation 92% o
n ventilator.

GENERAL:  The patient is alert, slight withdrawal from pain, moving left upper and lower extremities.
  Scant toe wiggle to right lower extremity.  No response in right upper extremity.  Extraocular move
ments appear intact, but the patient not following commands at this point in time.  ET and OG tube in
 place.

HEART:  Regular rate and rhythm.

LUNGS:  Coarse bilateral breath sounds.

ABDOMEN:  Soft, positive bowel sounds throughout.

EXTREMITIES:  Lower extremities without cyanosis or edema.  Green catheter in place, soft restraints 
in place.

 

LABORATORY DATA:  Hemoglobin 8.8, white blood count of 9.1, platelet count of 306.  Blood gas this a.
m., bicarbonate 18, pH 7.4, CO2 of 28.5, PaO2 of 81.5, potassium of 3.3, creatinine of 1.2, glucose r
anged 137-250 last 12 hours.  Sodium 138, chloride of 110, bicarbonate 22.  Urine and blood cultures 
grown from yesterday 24-hour read negative.  Chest x-ray, this a.m., a stable exam from prior chest x
-ray yesterday.

 

ASSESSMENT AND PLAN:  Intracranial bleed with placement of a ventricular drain; chronic kidney diseas
e, stage 3; hypokalemia; hypertension; type 2 diabetes; acute respiratory failure; fevers, continuing
 current management.  Agree with critical care, likely basilar stimulation of temperatures, following
 up on cultures, currently negative.  No left shift on cell counts.  No outward signs of infection on
 exam.  Likely a repeat CT early next week with Neurosurgery for any recommendations of pulling drain
 timeframe.  Pulmonology attempting to wean patient on sedation and extubate per their recommendation
s likely in next 48 hours.  Continuing tube feeds.  Blood glucose is largely in range; for the first 
time, the patient has been above 200.  We will continue to follow.  No adjustments at this point in t
jong.  Hypokalemia, being replaced per protocol.  The patient remains on Cardene drip for blood pressu
re control, which remains controlled.  Once patient is successfully extubated, we will need rehabilit
ation evaluation.  Beckie Moreira should return tomorrow to resume evaluation.

## 2018-02-25 NOTE — PRG
DATE OF SERVICE:  02/25/2018

 

Thirty-five minutes critical care time.

 

SUBJECTIVE:  The patient remains intubated on mechanical ventilation.  Today, she is arousable.  She 
is able to follow commands with her left side, but her right side is hemiparetic.

 

OBJECTIVE:

VITAL SIGNS:  Temperature 99.6, pulse 84, blood pressure 134/54.  She had a T-max of 100.6 last night
.  A 24-hour intake is 4149 and output 2806.

HEENT:  Pupils react.  Sclerae are anicteric.  She is drooling from her mouth.

NECK:  No JVD.

LUNGS:  Clear anteriorly.

CARDIOVASCULAR:  S1 and S2 regular without murmur.

ABDOMEN:  Soft, nontender.

EXTREMITIES:  No clubbing, cyanosis, or edema.

 

IMAGING:  Chest x-ray shows no mass, effusion or infiltrate.

 

LABORATORY DATA:  White blood cell count 9.1, hematocrit 27.6, platelet count 306.  PH of 7.42, pCO2 
of 28, pO2 of 81 on SIMV rate 14, tidal volume 450, PEEP 5, pressure support 10, FiO2 30%.  Sodium 13
8, potassium 3.3, chloride 110, CO2 of 22, BUN 23, creatinine 1.2, glucose 142.

 

ASSESSMENT:

1.  Acute respiratory failure, requiring mechanical ventilation.

2.  Intracranial bleed.

3.  Chronic kidney disease.

4.  Hypokalemia.

5.  Diabetes mellitus.

6.  Pre-existing dementia.

 

PLAN:

1.  Start scopolamine patch.

2.  Place on pressure support ventilation with hopes of extubating in the next 24-48 hours if we can 
control her airway secretions.

3.  Continue enteral tube feeds.

4.  Glucose control seems adequate at this time.

## 2018-02-25 NOTE — PRG
DATE OF SERVICE:  02/25/2018

 

Ms. Calloway in the ICU room this morning.  The family is unavailable.  Nursing does not report any sig
nificant events overnight.  Vital signs show a fever of 101.0 degrees Fahrenheit, yesterday morning a
t 8:00 a.m.

 

On examination, Ms. Calloway is more alert today.  Propofol was off earlier and restarted, but unless s
he opens her eyes to voice, she follows commands with the left hand.  She has right hemiplegia.  She 
has ocular motor difficulty from the mid brain extension of her thalamic hemorrhage.

 

Ms. Calloway's _____ is working well.  Plan is to continue to leave it open at 10 cm of water.  Her ult
rasound of her lower extremities was negative for DVT.  We will continue current management until Dr. Cheung's team is back tomorrow.

## 2018-02-25 NOTE — RAD
PORTABLE CHEST 1 VIEW:

 

Date:  02/25/18 

Time:  0516 hours

 

HISTORY:  

Respiratory failure. 

 

FINDINGS/IMPRESSION: 

 

Comparison made with exam from previous day. 

 

Endotracheal and nasogastric tubes remain in place. The heart size is upper limits of normal. Atelect
atic changes in the left lung base. No pneumothoraces or effusions are identified. 

 

 

POS: [facility]

## 2018-02-26 LAB
ANALYZER IN CARDIO: (no result)
ANION GAP SERPL CALC-SCNC: 10 MMOL/L (ref 10–20)
BASE EXCESS STD BLDA CALC-SCNC: -5.5 MEQ/L
BUN SERPL-MCNC: 23 MG/DL (ref 9.8–20.1)
CA-I BLDA-SCNC: 1.2 MMOL/L (ref 1.12–1.3)
CALCIUM SERPL-MCNC: 8.1 MG/DL (ref 7.8–10.44)
CHLORIDE SERPL-SCNC: 113 MMOL/L (ref 98–107)
CO2 SERPL-SCNC: 18 MMOL/L (ref 23–31)
CREAT CL PREDICTED SERPL C-G-VRATE: 0 ML/MIN (ref 70–130)
GLUCOSE SERPL-MCNC: 169 MG/DL (ref 83–110)
HCO3 BLDA-SCNC: 18.5 MEQ/L (ref 22–26)
HCT VFR BLDA CALC: 15.8 % (ref 36–47)
HGB BLD-MCNC: 8.1 G/DL (ref 12–16)
HGB BLDA-MCNC: 4.4 G/DL (ref 12–16)
MCH RBC QN AUTO: 28.3 PG (ref 27–31)
MCV RBC AUTO: 88.3 FL (ref 81–99)
MDIFF COMPLETE?: YES
O2 A-A PPRESDIFF RESPIRATORY: 171.15 MM[HG] (ref 0–20)
PCO2 BLDA: 28.2 MMHG (ref 35–45)
PH BLDA: 7.43 [PH] (ref 7.35–7.45)
PLATELET # BLD AUTO: 307 THOU/UL (ref 130–400)
PLATELET BLD QL SMEAR: (no result)
PO2 BLDA: 78.8 MMHG (ref 80–100)
POTASSIUM BLD-SCNC: 3.4 MMOL/L (ref 3.7–5.3)
POTASSIUM SERPL-SCNC: 3.5 MMOL/L (ref 3.5–5.1)
RBC # BLD AUTO: 2.88 MILL/UL (ref 4.2–5.4)
SODIUM SERPL-SCNC: 137 MMOL/L (ref 136–145)
SPECIMEN DRAWN FROM PATIENT: (no result)
WBC # BLD AUTO: 13.9 THOU/UL (ref 4.8–10.8)

## 2018-02-26 RX ADMIN — NICARDIPINE HYDROCHLORIDE PRN MLS: 25 INJECTION INTRAVENOUS at 16:45

## 2018-02-26 RX ADMIN — INSULIN HUMAN PRN UNIT: 100 INJECTION, SOLUTION PARENTERAL at 11:48

## 2018-02-26 RX ADMIN — NICARDIPINE HYDROCHLORIDE PRN MLS: 25 INJECTION INTRAVENOUS at 01:55

## 2018-02-26 RX ADMIN — NICARDIPINE HYDROCHLORIDE PRN MLS: 25 INJECTION INTRAVENOUS at 10:01

## 2018-02-26 RX ADMIN — NICARDIPINE HYDROCHLORIDE PRN MLS: 25 INJECTION INTRAVENOUS at 21:28

## 2018-02-26 RX ADMIN — CEFAZOLIN SCH MLS: 1 INJECTION, POWDER, FOR SOLUTION INTRAMUSCULAR; INTRAVENOUS at 15:03

## 2018-02-26 RX ADMIN — FAMOTIDINE SCH MG: 10 INJECTION, SOLUTION INTRAVENOUS at 20:39

## 2018-02-26 RX ADMIN — CEFAZOLIN SCH MLS: 1 INJECTION, POWDER, FOR SOLUTION INTRAMUSCULAR; INTRAVENOUS at 21:31

## 2018-02-26 RX ADMIN — CEFAZOLIN SCH MLS: 1 INJECTION, POWDER, FOR SOLUTION INTRAMUSCULAR; INTRAVENOUS at 06:06

## 2018-02-26 RX ADMIN — INSULIN HUMAN PRN UNIT: 100 INJECTION, SOLUTION PARENTERAL at 16:19

## 2018-02-26 RX ADMIN — INSULIN HUMAN PRN UNIT: 100 INJECTION, SOLUTION PARENTERAL at 06:06

## 2018-02-26 NOTE — PRG
DATE OF SERVICE:  02/26/2018

 

SUBJECTIVE:  The patient continues to have attempted to be weaned, still producing thick secretions, 
off ventilator, tolerating tube feeds well.  Green catheter in place.  Family at bedside at lunch.  A
nswered all questions prior to leaving bedside.  They do report she has a history of chronic UTIs and
 was on prophylactic antibiotics previously for control of them.  Nursing staff reports good CT with 
pressure setting recommendations from neurosurgery given for drain output in ventricles.  Pulmonology
 potentially talking about attempted extubation tomorrow if everything goes well.  The patient unable
 to answer formal review of systems or questioning, following commands today, moving left upper and l
ower extremity.  Right lower extremity toes minimally.  No right upper extremity movement.

 

OBJECTIVE:

VITAL SIGNS:  Temperature of 99.5, respiratory rate of 18, heart rate of 85, oxygen saturation 97%, o
n ventilator.

GENERAL:  The patient is alert and ventricular drain in place.  ET tube and OG tube are in place.

NECK:  Supple.  The patient is unable to move her head around freely.

HEART:  Regular rate and rhythm.

LUNGS:  Coarse to auscultation bilaterally.

ABDOMEN:  Soft, nontender, positive bowel sounds throughout.

EXTREMITIES:  Lower extremities with mild nonpitting edema.  The patient moving left upper and lower 
extremity on command, moving right toes on command, not able to move right upper extremity.

NEUROLOGIC:  Pupils equal, round, reactive, accommodate to light on neuro check.  Green catheter in p
lace.

 

LABORATORY DATA:  White blood cell count increased to 13.9, hemoglobin 8.1 neutrophilic percent 80.  
Potassium of 3.5, sodium 137, CO2 of 18, creatinine of 1.1, glucose range 161-181 in the last 12 hour
s.  Blood culture no growth at 48 hours.  Urine culture gram negative bull 5000 CFU.

 

ASSESSMENT AND PLAN:  Intracranial bleed disease, chronic kidney disease stage 2, diabetes type 2, ac
[place] respiratory failure, fever.  Following up on Neurosurgery and Pulmonology recommendations regardi
ng management of bleeding and ventilator settings.  Blood sugars currently controlled.  Continuing tu
be feeds.  Regarding fever, may ask microbiology to trend out low colony forming unit as differential
 for fever cause.  Urine remains clear, however.  We will continue to follow and reassess for signs a
nd symptoms of infection.  Filling in for Dr. Waldemar Moreira, who possibly will return tomorrow.

## 2018-02-26 NOTE — RAD
PORTABLE CHEST:

 

DATE: 2/26/18.

 

PROVIDED CLINICAL HISTORY: 

Respiratory insufficiency.

 

FINDINGS: 

 

COMPARISON: 

2/25/18.  Significant interval change with respect to the prior examination is not definitely apparen
t.  Areas of probable subsegmental atelectasis involve the right and left mid lung zones.  

 

IMPRESSION: 

As above.

 

POS: OFF

## 2018-02-26 NOTE — PRG
DATE OF SERVICE:  02/26/2018

 

Ms. Calloway is hospital day #5, admitted for obstructive hydrocephalus due to a left thalamic hemorrha
ge related to hypertension with intraventricular extension and neurological decline.  She has continu
ed to demonstrate signs of improvement.  She did follow commands for the nursing team last night.  Sapphire miller is sedated currently.  I would like to get a head CT as her intracranial pressures have been up to 
10 mmHg with various ranges of output up to 19 mL hourly.  Her EVD is currently open at 10 cm of wate
r.  With head CT the plan is to try and begin the weaning process.  I understand too that there is a 
plan for potential extubation in the near future.

## 2018-02-26 NOTE — PRG
DATE OF SERVICE:  02/26/2018

 

SUBJECTIVE:  Ms. Calloway is following commands and now making eye contact, but she is still densely he
miplegic.

 

OBJECTIVE:

VITAL SIGNS:  Heart rate is 102, blood pressure 118/53, respiratory rate 16, oximetry is 97%.

LUNGS:  Clear.

CARDIOVASCULAR:  Regular rhythm.  S1 and S2 are normal.

ABDOMEN:  Soft and nontender.

EXTREMITIES:  Without clubbing, cyanosis, or edema.

 

IMAGING:  Chest radiograph, reviewed by me today, shows some patchy atelectasis on the right.  Her x-
ray compared to yesterday is unchanged.

 

CT of her head done today shows a decrease in edema and regression of the hematoma.  She still has a 
mass effect and midline shift.

 

IMPRESSION:

1.  Parenchymal brain hemorrhage.

2.  Respiratory failure.

 

So, I explained to the son it is unclear whether or not she would be able to protect airway.  Asked h
im to contemplate whether or not she would want a tracheostomy.

 

She is down to a minimum of ventilatory support at this point in time.  We may be able to consider ex
tubation tomorrow depending on how she does overnight, but I would prefer that we have these question
s answered before we extubate her.

 

I answered all of their questions.

 

Critical care time was 30 minutes.

## 2018-02-26 NOTE — CT
CT HEAD WITHOUT CONTRAST:

 

HISTORY:

Follow-up intraparenchymal hematoma.

 

COMPARISON:

02/21/2018

 

TECHNIQUE:

Multiple axial tomograms obtained through the head without IV enhancement.

 

FINDINGS:

A ventriculostomy catheter has been placed through the right frontal lobe since the prior study.  The
 tip of the catheter enters the anterior horn on the right and appears to extend beyond the floor of 
the right lateral ventricle, in the midline.

 

The intraventricular hematoma, in the region of the left thalamus, has minimally decreased in size, m
easuring 3.1 cm in greatest dimension today, whereas it previously measured 3.9 cm.  Surrounding vaso
genic edema is more prominent.  Intraventricular hemorrhage is again noted.  Midline shift, from left
 to right, has increased slightly, measured at 4 to 5 mm on today's study.

 

IMPRESSION:

Slight regression of the hematoma, although increase in the surrounding vasogenic edema and slight in
crease in mass effect and midline shift.

 

POS: [facility]

## 2018-02-27 LAB
ANALYZER IN CARDIO: (no result)
ANION GAP SERPL CALC-SCNC: 11 MMOL/L (ref 10–20)
BASE EXCESS STD BLDA CALC-SCNC: -5.7 MEQ/L
BUN SERPL-MCNC: 19 MG/DL (ref 9.8–20.1)
CA-I BLDA-SCNC: 1.2 MMOL/L (ref 1.12–1.3)
CALCIUM SERPL-MCNC: 8.2 MG/DL (ref 7.8–10.44)
CHLORIDE SERPL-SCNC: 113 MMOL/L (ref 98–107)
CO2 SERPL-SCNC: 19 MMOL/L (ref 23–31)
CREAT CL PREDICTED SERPL C-G-VRATE: 50 ML/MIN (ref 70–130)
GLUCOSE SERPL-MCNC: 145 MG/DL (ref 83–110)
HCO3 BLDA-SCNC: 17.8 MEQ/L (ref 22–26)
HCT VFR BLDA CALC: 24.2 % (ref 36–47)
HGB BLD-MCNC: 8.3 G/DL (ref 12–16)
HGB BLDA-MCNC: 7.7 G/DL (ref 12–16)
MCH RBC QN AUTO: 27.7 PG (ref 27–31)
MCV RBC AUTO: 88.4 FL (ref 81–99)
MDIFF COMPLETE?: YES
PCO2 BLDA: 27.4 MMHG (ref 35–45)
PH BLDA: 7.43 [PH] (ref 7.35–7.45)
PLATELET # BLD AUTO: 336 THOU/UL (ref 130–400)
PLATELET BLD QL SMEAR: (no result)
PO2 BLDA: 92.1 MMHG (ref 80–100)
POTASSIUM BLD-SCNC: 3.5 MMOL/L (ref 3.7–5.3)
POTASSIUM SERPL-SCNC: 3.7 MMOL/L (ref 3.5–5.1)
RBC # BLD AUTO: 3 MILL/UL (ref 4.2–5.4)
SODIUM SERPL-SCNC: 139 MMOL/L (ref 136–145)
SPECIMEN DRAWN FROM PATIENT: (no result)
WBC # BLD AUTO: 10.1 THOU/UL (ref 4.8–10.8)

## 2018-02-27 RX ADMIN — INSULIN HUMAN PRN UNIT: 100 INJECTION, SOLUTION PARENTERAL at 00:31

## 2018-02-27 RX ADMIN — NICARDIPINE HYDROCHLORIDE PRN MLS: 25 INJECTION INTRAVENOUS at 07:37

## 2018-02-27 RX ADMIN — INSULIN HUMAN PRN UNIT: 100 INJECTION, SOLUTION PARENTERAL at 16:51

## 2018-02-27 RX ADMIN — NICARDIPINE HYDROCHLORIDE PRN MLS: 25 INJECTION INTRAVENOUS at 02:31

## 2018-02-27 RX ADMIN — CEFAZOLIN SCH MLS: 1 INJECTION, POWDER, FOR SOLUTION INTRAMUSCULAR; INTRAVENOUS at 06:03

## 2018-02-27 RX ADMIN — NICARDIPINE HYDROCHLORIDE PRN MLS: 25 INJECTION INTRAVENOUS at 18:27

## 2018-02-27 RX ADMIN — FAMOTIDINE SCH MG: 10 INJECTION, SOLUTION INTRAVENOUS at 21:05

## 2018-02-27 RX ADMIN — CEFAZOLIN SCH MLS: 1 INJECTION, POWDER, FOR SOLUTION INTRAMUSCULAR; INTRAVENOUS at 14:25

## 2018-02-27 RX ADMIN — INSULIN HUMAN PRN UNIT: 100 INJECTION, SOLUTION PARENTERAL at 21:04

## 2018-02-27 NOTE — PRG
DATE OF SERVICE:  02/27/2018

 

SUBJECTIVE:  Ms. Calloway has not improved much, although she is not any worse.  Her ventricular drain 
was clamped and her ICP throughout the day was remained in low.

 

OBJECTIVE:

VITAL SIGNS:  Her blood pressure has been stable.  She is afebrile.  Blood pressure 142/62 this eveni
ng, heart rate is 88, respiratory rate is 15.

LUNGS:  Clear.

HEART:  Regular rhythm.

ABDOMEN:  Soft and nontender.

 

She will awaken and interact with her family.

 

LABORATORY DATA:  White count is 10, hemoglobin 8, platelets 336,000.  Sodium 139, potassium 3.7, chl
oride 113, bicarbonate 19, BUN 19, creatinine 1.21, glucose 145, pH 7.43, CO2 27, pO2 of 92.

 

IMAGING:  Chest radiograph shows no alveolar infiltrates.  She has small left effusion or atelectasis
, reviewed by me.

 

IMPRESSION:  Respiratory failure associated with a hemorrhagic cerebrovascular accident.  Family has 
an excellent understanding of her prognosis for functional recovery.  The plan tomorrow is to pull he
r ventriculostomy and extubate her.  The plan after that will be not to reintubate her.

 

She will be a do not resuscitate patient and will be from here forward.

 

Dr. Cheung met with family and I met with the family as well.

 

All their questions were answered.

 

CRITICAL CARE TIME:  30 minutes.

## 2018-02-27 NOTE — RAD
AP CHEST:

 

History: Ventilator dependent patient. 

 

Date: 2-27-18 

 

Comparison: 2-26-18

 

FINDINGS: 

AP chest demonstrates nasogastric and endotracheal tubes to be in place. ACDF fusions plates in place
. 

 

There is a left sided pleural effusion. 

 

Pulmonary vascular congestion is noted. 

 

IMPRESSION: 

Left sided pleural effusion and pulmonary vascular congestive changes. 

 

POS: [facility]

## 2018-02-27 NOTE — PRG
DATE OF SERVICE:  02/27/2018

 

I had a long discussion with Ms. Calloway's family and in short in honoring her wishes, we will continu
e with the external ventricular drain clamped over the next 24 hours with the understanding that we w
ill plan to remove tomorrow, I think along with extubation.  At this point, I think we have given her
 enough time to clear the blood and the family does not wish to pursue tracheostomy, PEG tube or shun
ting should it be necessary.  I certainly agree with their plan in this regard and the patient's wish
es obviously.

## 2018-02-27 NOTE — PRG
DATE OF SERVICE:  02/27/2017

 

Ms. Calloway is hospital day 6 following placement of a right frontal external ventricular drain for ob
structive hydrocephalus related to a left thalamic hemorrhage.  Her head CT yesterday was satisfactor
y in regards decompression of her ventricular system with satisfactory positioning of the drain.  Her
 blood is also starting to clear in the ventricular system as well and the fourth ventricle is now op
en.  This is all encouraging and as such, I raised her EVD to 15 cm of water.  Her drain output has b
een approximately 6 mL per hour with intracranial pressures ranging from 3-8 mmHg.  She does follow c
ommands, which is quite encouraging.  She has required sedation given the fact that she has been agit
ated at times.  What I would like to do today is clamp her EVD.  I have written an order for opening 
parameters should it be necessary.  My hope is that we can keep the EVD clamped and the fact that her
 drain output went down despite raising her EVD yesterday is a good sign in that regard.  I would pre
dafne it if we could avoid placement of a shunt for this patient and I am encouraged thus far that we w
ill not need to  As far as extubation,  I would be in favor of it if Dr. Curry feels it appropriate. 
 Otherwise, obviously consideration of tracheostomy could be entertained.  I will come back between s
urgeries today to try and see the family.

## 2018-02-28 VITALS — DIASTOLIC BLOOD PRESSURE: 55 MMHG | SYSTOLIC BLOOD PRESSURE: 120 MMHG

## 2018-02-28 LAB
ANION GAP SERPL CALC-SCNC: 12 MMOL/L (ref 10–20)
BUN SERPL-MCNC: 24 MG/DL (ref 9.8–20.1)
CALCIUM SERPL-MCNC: 8.2 MG/DL (ref 7.8–10.44)
CHLORIDE SERPL-SCNC: 110 MMOL/L (ref 98–107)
CO2 SERPL-SCNC: 20 MMOL/L (ref 23–31)
CREAT CL PREDICTED SERPL C-G-VRATE: 47 ML/MIN (ref 70–130)
GLUCOSE SERPL-MCNC: 227 MG/DL (ref 83–110)
HGB BLD-MCNC: 8.5 G/DL (ref 12–16)
MCH RBC QN AUTO: 27.9 PG (ref 27–31)
MCV RBC AUTO: 89.4 FL (ref 81–99)
MDIFF COMPLETE?: YES
PLATELET # BLD AUTO: 312 THOU/UL (ref 130–400)
PLATELET BLD QL SMEAR: (no result)
POTASSIUM SERPL-SCNC: 3.7 MMOL/L (ref 3.5–5.1)
RBC # BLD AUTO: 3.06 MILL/UL (ref 4.2–5.4)
SODIUM SERPL-SCNC: 138 MMOL/L (ref 136–145)
WBC # BLD AUTO: 10.8 THOU/UL (ref 4.8–10.8)

## 2018-02-28 RX ADMIN — NICARDIPINE HYDROCHLORIDE PRN MLS: 25 INJECTION INTRAVENOUS at 13:06

## 2018-02-28 RX ADMIN — NICARDIPINE HYDROCHLORIDE PRN MLS: 25 INJECTION INTRAVENOUS at 00:54

## 2018-02-28 RX ADMIN — FAMOTIDINE SCH MG: 10 INJECTION, SOLUTION INTRAVENOUS at 20:41

## 2018-02-28 RX ADMIN — SCOPALAMINE SCH MG: 1 PATCH, EXTENDED RELEASE TRANSDERMAL at 09:01

## 2018-02-28 RX ADMIN — NICARDIPINE HYDROCHLORIDE PRN MLS: 25 INJECTION INTRAVENOUS at 19:07

## 2018-02-28 RX ADMIN — INSULIN HUMAN PRN UNIT: 100 INJECTION, SOLUTION PARENTERAL at 09:57

## 2018-02-28 RX ADMIN — MORPHINE SULFATE PRN MG: 10 INJECTION, SOLUTION INTRAMUSCULAR; INTRAVENOUS at 20:40

## 2018-02-28 RX ADMIN — MORPHINE SULFATE PRN MG: 10 INJECTION, SOLUTION INTRAMUSCULAR; INTRAVENOUS at 14:24

## 2018-02-28 RX ADMIN — MORPHINE SULFATE PRN MG: 10 INJECTION, SOLUTION INTRAMUSCULAR; INTRAVENOUS at 16:53

## 2018-02-28 RX ADMIN — NICARDIPINE HYDROCHLORIDE PRN MLS: 25 INJECTION INTRAVENOUS at 07:45

## 2018-02-28 NOTE — RAD
PORTABLE CHEST:

 

History: Respiratory distress. 

 

Comparison: Prior day's study. 

 

FINDINGS: 

Endotracheal and NG tubes are in satisfactory position. Subsegmental atelectatic changes are seen in 
the lung bases. Vascular engorgement is improved since the prior examination. 

 

IMPRESSION: 

Improving pulmonary vascular engorgement with resolving interstitial lung changes and improving atele
ctatic lung change. 

 

POS: OFF

## 2018-02-28 NOTE — PRG
DATE OF SERVICE:  02/28/2018

 

SUBJECTIVE:  Trisha Calloway had a ventriculostomy removed this morning.  We extubated her shortly the
reafter when family arrived.

 

Lungs, heart, and abdomen are unchanged prior to extubation.

 

After extubation, became quickly very clear that she was having upper airway collapse with respirator
y effort.

 

Her intake and outputs negative 1465 this morning.

 

LABORATORY DATA:  White count 10.8, hemoglobin 8.5, platelets 312,000.

 

Sodium 138, potassium 3.7, chloride 110, bicarbonate 20, BUN 24, creatinine 1.28.

 

IMPRESSION:

1.  Parenchymal brain hemorrhage.

2.  Early dementia.

3.  Deconditioning.

 

PLAN:  Comfort care.  In my opinion, she can survive this based on her current respiratory pattern.  
She will be given morphine for comfort.  Family has been updated.

 

CRITICAL CARE:  30 minutes.

## 2018-02-28 NOTE — PRG
DATE OF SERVICE:  02/28/2018

 

Ms. Calloway is still intubated.  She is nonresponsive due to sedation.  According to nurse's report, s
he is more responsive, able to move her extremities.

 

PHYSICAL EXAMINATION

LUNGS:  Clear.

HEART:  Reveals no murmur.

 

LABORATORY DATA:  Her white blood count is 10.8, hemoglobin 8.5, hematocrit 27.3.  Chemistry:  Sodium
 138 and potassium 3.7.  Blood sugars are adequately controlled.

 

IMPRESSION:  Intracerebral hemorrhage, improvement by history.

 

PLAN:  Continue supportive care at this time.  Await further recommendations regarding extubation fro
m Pulmonary as well as neurological input.

## 2018-02-28 NOTE — PRG
DATE OF SERVICE:  02/28/2018

 

Ms. Calloway did not require opening of her external ventricular drain overnight and as such I have rem
tavon it.  On exam she opens her eyes, but she does not follow commands.  She localizes.  The plan is 
for extubation today.  The family wishes to pursue nonaggressive measures should the patient succumbe
d to hydrocephalus and she will likely be made DNR/DNI later today.

## 2018-03-01 VITALS — TEMPERATURE: 98.8 F

## 2018-03-01 LAB
ANION GAP SERPL CALC-SCNC: 12 MMOL/L (ref 10–20)
BUN SERPL-MCNC: 21 MG/DL (ref 9.8–20.1)
CALCIUM SERPL-MCNC: 8.2 MG/DL (ref 7.8–10.44)
CHLORIDE SERPL-SCNC: 115 MMOL/L (ref 98–107)
CO2 SERPL-SCNC: 18 MMOL/L (ref 23–31)
CREAT CL PREDICTED SERPL C-G-VRATE: 48 ML/MIN (ref 70–130)
GLUCOSE SERPL-MCNC: 150 MG/DL (ref 83–110)
HGB BLD-MCNC: 8.3 G/DL (ref 12–16)
MCH RBC QN AUTO: 27.7 PG (ref 27–31)
MCV RBC AUTO: 87.3 FL (ref 81–99)
MDIFF COMPLETE?: YES
PLATELET # BLD AUTO: 399 THOU/UL (ref 130–400)
POTASSIUM SERPL-SCNC: 3.7 MMOL/L (ref 3.5–5.1)
RBC # BLD AUTO: 3 MILL/UL (ref 4.2–5.4)
SODIUM SERPL-SCNC: 141 MMOL/L (ref 136–145)
WBC # BLD AUTO: 11.1 THOU/UL (ref 4.8–10.8)

## 2018-03-01 RX ADMIN — MORPHINE SULFATE PRN MG: 10 INJECTION, SOLUTION INTRAMUSCULAR; INTRAVENOUS at 12:27

## 2018-03-01 RX ADMIN — MORPHINE SULFATE PRN MG: 10 INJECTION, SOLUTION INTRAMUSCULAR; INTRAVENOUS at 11:01

## 2018-03-01 RX ADMIN — NICARDIPINE HYDROCHLORIDE PRN MLS: 25 INJECTION INTRAVENOUS at 06:18

## 2018-03-01 NOTE — PRG
DATE OF SERVICE:  03/01/2018

 

SUBJECTIVE:  Ms. Calloway is hospital day #8, admitted for obstructive hydrocephalus related to a left 
thalamic hemorrhage with intraventricular extension.  Her external ventricular drain was weaned and r
emoved.  This morning, she is alert, she opens her eyes to voice.  She has a disconjugate gaze with a
nisocoria.  She has right-sided hemiplegia.  She does try and attend to the exam and I suspect she ha
s diplopia.  Her hemorrhage extends from the left thalamus into the mesencephalon.  She localizes in 
the left arm and moves spontaneously and again is hemiplegic in the right arm and right leg.  She wit
hdraws in the left leg.  She is extubated.  The family has wished to deescalate care.  I will be fine
 with raising her systolic blood pressure allowance to 160 and treat over that level.  This will allo
w her to get out of the ICU and off the nicardipine drip.  We will arrange appropriate followup in ou
r clinic.  Sign off at this time.  I should note her external ventricular drain wound is dry.

## 2018-03-01 NOTE — RAD
PORTABLE AP CHEST:

 

Date: 3-1-18 

 

History: On ventilator. 

 

Comparison: 2-28-18

 

FINDINGS: 

Endotracheal tube and nasogastric tubes have been removed. There is atelectasis present at each lung 
base. Cardiac silhouette and pulmonary vasculature are within normal limits for the technique of the 
exam. Post-surgical changes lower cervical spine are again seen. 

 

IMPRESSION: 

Bibasilar atelectasis with interval removal of endotracheal and nasogastric tubes. 

 

POS: [facility]

## 2018-03-01 NOTE — PRG
DATE OF SERVICE:  03/01/2018

 

Ms. Calloway became tachycardic yesterday afternoon.  This was all related to respiratory distress.  

 

PHYSICAL EXAMINATION: 

VITAL SIGNS:  Her heart rates in the 1-teens now.  Blood pressure 141/74, respiratory rates in the te
ens.  

LUNGS:  Lungs are remarkable for rhonchi bilaterally.  

HEART:  Regular rhythm.

ABDOMEN:  Abdomen is soft.

 

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

 

It does not appear that she is improving and she is quite somnolent most of the day.

 

I think it is reasonable to consider inpatient hospice for her based on my interactions with the [facility]

## 2018-03-01 NOTE — PRG
DATE OF SERVICE:  03/01/2018 

 

Ms. Calloway is awake.  She does respond to spoken words.  She responds to my voice.  She is still dens
ely pruritic on the right side.

 

PHYSICAL EXAMINATION:

VITAL SIGNS:  Blood pressure 130/68, pulse 111, temperature 98.7.

LUNGS:  Reveal scattered rhonchi, no wheezes.

HEART:  Reveals tachycardia without murmur.

 

LABORATORY:  Hemoglobin 8.3, 26.2.  Blood sugars are out of control.  Urine culture is growing Pseudo
monas aeruginosa.  This is sensitive to multiple antibiotics.

 

IMPRESSION:

1.  Thalamic bleed.

2.  Urinary tract infection.

 

PLAN:  Dr. Cheung discussed with the family relatively taken an approach of less intervention.  The f
chantell was in agreement with this.  Do not see the prognosis improving for her significantly.  She pro
bably could be moved out of the unit here in a few hours.

## 2018-03-05 NOTE — DIS
DATE OF ADMISSION:  02/21/2018

 

DATE OF DISCHARGE:  03/01/2018

 

Lam Castillo PA-C dictating for Drew Cheung M.D.

 

DISCHARGE DIAGNOSES:

1.  Intraventricular hemorrhage with neurologic decline.

2.  Seizure disorder.

3.  Dementia.

4.  Transient ischemic attack.

5.  Hyponatremia.

6.  Type 2 diabetes.

7.  Hypertension.

 

HOSPITAL COURSE:  Ms. Calloway presented to [facility] with her family due to altered me
ntal status.  She was found unresponsive with left thalamic hemorrhage.  She emergently underwent EVD
 placement or hemorrhage and throughout her hospital course, was seen by multiple medical specialists
 including Critical Care and Neurosurgery.  The patient had minimal improvement in her neurologic sta
tus after several overnight stays at [facility] and her family decided to withdraw care and 
have her discharged home on hospice care.  Outpatient followup appointments are not necessary at this
 time, so the patient again was deemed to have a terminal condition and again was placed on hospice. 
 The family at the time of discharge was very appreciative of all the medical care the patient receiv
ed at [facility].

## 2018-03-10 NOTE — EKG
Test Reason : 

Blood Pressure : ***/*** mmHG

Vent. Rate : 117 BPM     Atrial Rate : 117 BPM

   P-R Int : 176 ms          QRS Dur : 086 ms

    QT Int : 340 ms       P-R-T Axes : 068 070 039 degrees

   QTc Int : 474 ms

 

Sinus tachycardia

Septal infarct , age undetermined

Abnormal ECG

 

Confirmed by BHAVIN PIERSON, SHANIQUE ([id number]),  ESTHELA GONZALEZ ([id number]) on 3/10/2018 5:33:35 PM

 

Referred By:             Confirmed By:SHANIQUE DELCID MD